# Patient Record
Sex: FEMALE | Race: BLACK OR AFRICAN AMERICAN | NOT HISPANIC OR LATINO | URBAN - METROPOLITAN AREA
[De-identification: names, ages, dates, MRNs, and addresses within clinical notes are randomized per-mention and may not be internally consistent; named-entity substitution may affect disease eponyms.]

---

## 2018-04-23 ENCOUNTER — OUTPATIENT (OUTPATIENT)
Dept: OUTPATIENT SERVICES | Facility: HOSPITAL | Age: 68
LOS: 1 days | End: 2018-04-23
Payer: COMMERCIAL

## 2018-04-23 ENCOUNTER — APPOINTMENT (OUTPATIENT)
Dept: NEUROSURGERY | Facility: CLINIC | Age: 68
End: 2018-04-23
Payer: MEDICAID

## 2018-04-23 VITALS
HEIGHT: 61 IN | BODY MASS INDEX: 35.5 KG/M2 | RESPIRATION RATE: 16 BRPM | HEART RATE: 81 BPM | WEIGHT: 188 LBS | DIASTOLIC BLOOD PRESSURE: 77 MMHG | OXYGEN SATURATION: 98 % | SYSTOLIC BLOOD PRESSURE: 148 MMHG | TEMPERATURE: 97.6 F

## 2018-04-23 DIAGNOSIS — Z87.39 PERSONAL HISTORY OF OTHER DISEASES OF THE MUSCULOSKELETAL SYSTEM AND CONNECTIVE TISSUE: ICD-10-CM

## 2018-04-23 DIAGNOSIS — Z87.19 PERSONAL HISTORY OF OTHER DISEASES OF THE DIGESTIVE SYSTEM: ICD-10-CM

## 2018-04-23 DIAGNOSIS — Z78.9 OTHER SPECIFIED HEALTH STATUS: ICD-10-CM

## 2018-04-23 PROCEDURE — 99205 OFFICE O/P NEW HI 60 MIN: CPT

## 2018-04-23 PROCEDURE — 72110 X-RAY EXAM L-2 SPINE 4/>VWS: CPT | Mod: 26

## 2018-04-23 PROCEDURE — 72110 X-RAY EXAM L-2 SPINE 4/>VWS: CPT

## 2018-04-24 PROBLEM — Z87.19 HISTORY OF DIVERTICULOSIS: Status: RESOLVED | Noted: 2018-04-23 | Resolved: 2018-04-24

## 2018-04-24 PROBLEM — Z87.39 HISTORY OF ARTHRITIS: Status: RESOLVED | Noted: 2018-04-23 | Resolved: 2018-04-24

## 2018-04-24 PROBLEM — Z78.9 NON-SMOKER: Status: ACTIVE | Noted: 2018-04-23

## 2018-04-24 PROBLEM — Z78.9 DOES NOT USE ILLICIT DRUGS: Status: ACTIVE | Noted: 2018-04-23

## 2018-04-24 PROBLEM — Z87.39 HISTORY OF OSTEOARTHRITIS: Status: RESOLVED | Noted: 2018-04-23 | Resolved: 2018-04-24

## 2018-04-26 ENCOUNTER — APPOINTMENT (OUTPATIENT)
Dept: OBGYN | Facility: CLINIC | Age: 68
End: 2018-04-26
Payer: MEDICAID

## 2018-04-26 VITALS
WEIGHT: 188.13 LBS | DIASTOLIC BLOOD PRESSURE: 80 MMHG | BODY MASS INDEX: 35.52 KG/M2 | SYSTOLIC BLOOD PRESSURE: 130 MMHG | HEIGHT: 61 IN

## 2018-04-26 DIAGNOSIS — I10 ESSENTIAL (PRIMARY) HYPERTENSION: ICD-10-CM

## 2018-04-26 DIAGNOSIS — Z82.49 FAMILY HISTORY OF ISCHEMIC HEART DISEASE AND OTHER DISEASES OF THE CIRCULATORY SYSTEM: ICD-10-CM

## 2018-04-26 DIAGNOSIS — D64.9 ANEMIA, UNSPECIFIED: ICD-10-CM

## 2018-04-26 DIAGNOSIS — Z80.3 FAMILY HISTORY OF MALIGNANT NEOPLASM OF BREAST: ICD-10-CM

## 2018-04-26 DIAGNOSIS — Z81.8 FAMILY HISTORY OF OTHER MENTAL AND BEHAVIORAL DISORDERS: ICD-10-CM

## 2018-04-26 DIAGNOSIS — R42 DIZZINESS AND GIDDINESS: ICD-10-CM

## 2018-04-26 DIAGNOSIS — E78.00 PURE HYPERCHOLESTEROLEMIA, UNSPECIFIED: ICD-10-CM

## 2018-04-26 DIAGNOSIS — K21.9 GASTRO-ESOPHAGEAL REFLUX DISEASE W/OUT ESOPHAGITIS: ICD-10-CM

## 2018-04-26 DIAGNOSIS — K57.92 DIVERTICULITIS OF INTESTINE, PART UNSPECIFIED, W/OUT PERFORATION OR ABSCESS W/OUT BLEEDING: ICD-10-CM

## 2018-04-26 DIAGNOSIS — R91.8 OTHER NONSPECIFIC ABNORMAL FINDING OF LUNG FIELD: ICD-10-CM

## 2018-04-26 DIAGNOSIS — D47.2 MONOCLONAL GAMMOPATHY: ICD-10-CM

## 2018-04-26 PROCEDURE — 99202 OFFICE O/P NEW SF 15 MIN: CPT

## 2018-04-26 RX ORDER — MECLIZINE HYDROCHLORIDE 12.5 MG/1
12.5 TABLET ORAL
Refills: 0 | Status: COMPLETED | COMMUNITY
End: 2018-04-26

## 2018-05-08 ENCOUNTER — OUTPATIENT (OUTPATIENT)
Dept: OUTPATIENT SERVICES | Facility: HOSPITAL | Age: 68
LOS: 1 days | End: 2018-05-08
Payer: COMMERCIAL

## 2018-05-08 ENCOUNTER — APPOINTMENT (OUTPATIENT)
Dept: CT IMAGING | Facility: HOSPITAL | Age: 68
End: 2018-05-08

## 2018-05-08 PROCEDURE — 72131 CT LUMBAR SPINE W/O DYE: CPT

## 2018-05-08 PROCEDURE — 72131 CT LUMBAR SPINE W/O DYE: CPT | Mod: 26

## 2018-06-04 ENCOUNTER — APPOINTMENT (OUTPATIENT)
Dept: NEUROSURGERY | Facility: CLINIC | Age: 68
End: 2018-06-04
Payer: MEDICAID

## 2018-06-04 VITALS
WEIGHT: 187 LBS | RESPIRATION RATE: 18 BRPM | HEART RATE: 74 BPM | TEMPERATURE: 97.8 F | OXYGEN SATURATION: 100 % | HEIGHT: 61 IN | SYSTOLIC BLOOD PRESSURE: 148 MMHG | BODY MASS INDEX: 35.3 KG/M2 | DIASTOLIC BLOOD PRESSURE: 77 MMHG

## 2018-06-04 PROCEDURE — 99215 OFFICE O/P EST HI 40 MIN: CPT

## 2018-06-22 ENCOUNTER — APPOINTMENT (OUTPATIENT)
Dept: ORTHOPEDIC SURGERY | Facility: CLINIC | Age: 68
End: 2018-06-22

## 2018-09-03 ENCOUNTER — FORM ENCOUNTER (OUTPATIENT)
Age: 68
End: 2018-09-03

## 2018-09-04 ENCOUNTER — APPOINTMENT (OUTPATIENT)
Dept: ORTHOPEDIC SURGERY | Facility: CLINIC | Age: 68
End: 2018-09-04
Payer: MEDICAID

## 2018-09-04 ENCOUNTER — OUTPATIENT (OUTPATIENT)
Dept: OUTPATIENT SERVICES | Facility: HOSPITAL | Age: 68
LOS: 1 days | End: 2018-09-04
Payer: COMMERCIAL

## 2018-09-04 DIAGNOSIS — Z87.19 PERSONAL HISTORY OF OTHER DISEASES OF THE DIGESTIVE SYSTEM: ICD-10-CM

## 2018-09-04 DIAGNOSIS — Z86.39 PERSONAL HISTORY OF OTHER ENDOCRINE, NUTRITIONAL AND METABOLIC DISEASE: ICD-10-CM

## 2018-09-04 PROCEDURE — 73564 X-RAY EXAM KNEE 4 OR MORE: CPT | Mod: 26,LT,RT

## 2018-09-04 PROCEDURE — 73564 X-RAY EXAM KNEE 4 OR MORE: CPT

## 2018-09-04 PROCEDURE — 72170 X-RAY EXAM OF PELVIS: CPT

## 2018-09-04 PROCEDURE — 99203 OFFICE O/P NEW LOW 30 MIN: CPT

## 2018-09-04 PROCEDURE — 72170 X-RAY EXAM OF PELVIS: CPT | Mod: 26

## 2018-10-19 ENCOUNTER — APPOINTMENT (OUTPATIENT)
Dept: ORTHOPEDIC SURGERY | Facility: CLINIC | Age: 68
End: 2018-10-19

## 2019-03-12 ENCOUNTER — APPOINTMENT (OUTPATIENT)
Dept: GASTROENTEROLOGY | Facility: CLINIC | Age: 69
End: 2019-03-12

## 2019-05-07 ENCOUNTER — APPOINTMENT (OUTPATIENT)
Dept: ORTHOPEDIC SURGERY | Facility: CLINIC | Age: 69
End: 2019-05-07

## 2019-09-09 ENCOUNTER — APPOINTMENT (OUTPATIENT)
Dept: NEUROSURGERY | Facility: CLINIC | Age: 69
End: 2019-09-09
Payer: MEDICARE

## 2019-09-09 VITALS
WEIGHT: 195 LBS | SYSTOLIC BLOOD PRESSURE: 137 MMHG | BODY MASS INDEX: 36.82 KG/M2 | DIASTOLIC BLOOD PRESSURE: 81 MMHG | RESPIRATION RATE: 16 BRPM | OXYGEN SATURATION: 98 % | HEART RATE: 78 BPM | HEIGHT: 61 IN

## 2019-09-09 PROCEDURE — 99215 OFFICE O/P EST HI 40 MIN: CPT

## 2019-09-10 NOTE — ASSESSMENT
[FreeTextEntry1] : MRI lumbar spine from 8/28/19 reviewed by Dr. Hopkins with patient in detail, demonstrates spinal stenosis at L4-L5, L5-S1.\par Discussed treatment options including surgery, conservative measures (pain management, PT) in detail. Risks, benefits and alternatives to treatment options discussed in language the patient understands and an understanding was verbalized.\par The patient prefers to pursue conservative measures at this point and would like to see pain management. She does not want to proceed with surgery at this time.\par Referral provided for pain management - Dr. Ehsan Aguila. Patient will make an appointment.\par Educated regarding s/s neurological worsening including weakness, numbness/tingling, bowel/bladder incontinence report to ED or notify MD.\par If symptoms persist or worsen, return to the office for further discussion of surgery.\par Education provided regarding plan of care.\par \par Patient verbalizes agreement and understanding with plan.

## 2019-09-10 NOTE — DATA REVIEWED
[de-identified] : MRI lumbar spine without contrast 8/28/19:\par \par Impression:\par 1. No acute compression fracture\par 2. Degenerative changes resulting in spinal canal and neural foraminal stenosis similar prior.\par 3. Moderate spinal canal stenosis at L4-L5 and moderate LEFT neuroforaminal narrowing at L4-L5\par 4. Severe right and moderate left neuroforaminal narrowing at L5-S1\par 5. Uterine leiomyomata

## 2019-09-10 NOTE — HISTORY OF PRESENT ILLNESS
[FreeTextEntry1] : This is a 67 year old woman who came to the office last month with complaints of progressive, worsening low back pain for several years and LEFT leg radicular pain above the knee in an L3 distribution. Her leg pain subsided after an epidural steroid injection, however, her back pain has not improved. Her MRI lumbar spine shows a left side L3-4 lateral recess and foraminal disc herniation that severely encroaches on the exiting L3 nerve root. She also has a large pelvic fibroid that may be compressing the lumbosacral plexus. She was referred to GYN for consultation regarding fibroid. She returns today for follow up s/p CT lumbosacral spine. She reports that her left leg pain has returned and radiates from her lower back down the left leg to the foot. She also reports numbness and tingling down the left posterior leg. She complains of left knee pain and swelling as well. She has an appointment with an orthopedic surgeon regarding her knee pain. She also states she was seen by CELSO Hutton for her uterine fibroid and referred to Dr. Pagan for evaluation for hysterectomy. \par \par CT lumbar spine shows multilevel degenerative changes without fracture or evidence of instability

## 2019-09-10 NOTE — PHYSICAL EXAM
[General Appearance - Alert] : alert [General Appearance - In No Acute Distress] : in no acute distress [Oriented To Time, Place, And Person] : oriented to person, place, and time [Straight-Leg Raise Test - Left] : straight leg raise of the left leg was positive [Straight-Leg Raise Test - Right] : straight leg raise of the right leg was positive [Antalgic] : antalgic [Sclera] : the sclera and conjunctiva were normal [Outer Ear] : the ears and nose were normal in appearance [Neck Appearance] : the appearance of the neck was normal [] : no respiratory distress [Respiration, Rhythm And Depth] : normal respiratory rhythm and effort [Heart Rate And Rhythm] : heart rate was normal and rhythm regular [Abnormal Walk] : normal gait [Skin Color & Pigmentation] : normal skin color and pigmentation [FreeTextEntry6] : strength exam limited due to pain

## 2019-09-10 NOTE — REASON FOR VISIT
[Follow-Up: _____] : a [unfilled] follow-up visit [FreeTextEntry1] : \sandra Returns to the office today with new MRI lumbar spine to review.\par Ms. Sorensen states that after she saw Dr. Hopkins last June 2018, she followed up with her GYN, Dr. Wendy Daley who did not feel that a hysterectomy or removal of uterine fibroid was necessary nor contributing to Ms. Sorensen's pain. \par She saw a pain management doctor after that and had TORITO x 2 with resolution of symptoms.\par However, over the past 2-3 months she states that symptoms have returned. She reports low back pain radiating to bilateral groin (RIGHT greater than LEFT). She also reports radiating leg pain bilaterally down bilateral buttocks into posterior thighs with associated numbness/tingling. Her most severe pain is low back pain and bilateral groin pain. Pain is made worse with ambulation and improves with rest.\par She recently went to [facility] ED due to worsening symptoms. MRI lumbar spine was done at that visit on 8/28/19 and she brings it today for review. She was recommended to follow up with pain management but has not done so at this point.\par \sandra Denies bowel/bladder incontinence. She reports leg weakness due to pain.\par

## 2019-10-07 ENCOUNTER — FORM ENCOUNTER (OUTPATIENT)
Age: 69
End: 2019-10-07

## 2019-10-08 ENCOUNTER — APPOINTMENT (OUTPATIENT)
Dept: ORTHOPEDIC SURGERY | Facility: CLINIC | Age: 69
End: 2019-10-08
Payer: MEDICARE

## 2019-10-08 ENCOUNTER — OUTPATIENT (OUTPATIENT)
Dept: OUTPATIENT SERVICES | Facility: HOSPITAL | Age: 69
LOS: 1 days | End: 2019-10-08
Payer: COMMERCIAL

## 2019-10-08 DIAGNOSIS — M17.0 BILATERAL PRIMARY OSTEOARTHRITIS OF KNEE: ICD-10-CM

## 2019-10-08 PROCEDURE — 73562 X-RAY EXAM OF KNEE 3: CPT | Mod: 26,50

## 2019-10-08 PROCEDURE — 73562 X-RAY EXAM OF KNEE 3: CPT

## 2019-10-08 PROCEDURE — 99214 OFFICE O/P EST MOD 30 MIN: CPT

## 2019-10-22 NOTE — ASSESSMENT
[FreeTextEntry1] : 70yo female with bilateral knee OA\par \par Cymedical study right knee\par Euflexa injections ordered for left knee\par follow up as directed\par \par \par All medical record entries made by the PA/Janinaibjudy/Fellow are at my, Dr. Timothy Mcdaniel's direction and personally dictated by me on 10/08/2019]. I have reviewed the chart and agree that the record accurately reflects my personal performance of the history, physical exam, assessment, and plan. I have also personally directed reviewed, and agreed with the chart.\par

## 2019-10-22 NOTE — PHYSICAL EXAM
[de-identified] : Right and  Left Knee: skin is intact, no erythema. no joint effusion. + lateral and medial joint line tenderness, + apleys, negative lachman, no collateral ligamentous laxity with stress. ROM is 5-100 degrees with no pain. DF/PF/EHL intact with 5/5 strength. 2+ DP pulse. SILT L3-S1.\par  [de-identified] : right and left knee xray: no fractures,  joint space narrowing, osteophyte formation. impression- bilateral tricompartmental Knee OA with varus alignment. \par

## 2019-10-22 NOTE — HISTORY OF PRESENT ILLNESS
[Worsening] : worsening [3] : an average pain level of 3/10 [de-identified] : severe bilateral knee pain for many years. no trauma. steroid injection x3 have not helped over the last 2 years. she has pain every day. no relief with PT.

## 2019-10-23 ENCOUNTER — APPOINTMENT (OUTPATIENT)
Dept: PULMONOLOGY | Facility: CLINIC | Age: 69
End: 2019-10-23

## 2019-11-08 ENCOUNTER — RX RENEWAL (OUTPATIENT)
Age: 69
End: 2019-11-08

## 2019-11-15 ENCOUNTER — APPOINTMENT (OUTPATIENT)
Dept: PULMONOLOGY | Facility: CLINIC | Age: 69
End: 2019-11-15

## 2020-03-02 ENCOUNTER — APPOINTMENT (OUTPATIENT)
Dept: OBGYN | Facility: CLINIC | Age: 70
End: 2020-03-02

## 2020-08-05 ENCOUNTER — APPOINTMENT (OUTPATIENT)
Dept: OBGYN | Facility: CLINIC | Age: 70
End: 2020-08-05

## 2020-09-18 ENCOUNTER — APPOINTMENT (OUTPATIENT)
Dept: OBGYN | Facility: CLINIC | Age: 70
End: 2020-09-18
Payer: MEDICARE

## 2020-09-18 VITALS
HEIGHT: 61 IN | WEIGHT: 205.31 LBS | BODY MASS INDEX: 38.76 KG/M2 | SYSTOLIC BLOOD PRESSURE: 130 MMHG | DIASTOLIC BLOOD PRESSURE: 81 MMHG

## 2020-09-18 DIAGNOSIS — Z00.00 ENCOUNTER FOR GENERAL ADULT MEDICAL EXAMINATION W/OUT ABNORMAL FINDINGS: ICD-10-CM

## 2020-09-18 DIAGNOSIS — D25.9 LEIOMYOMA OF UTERUS, UNSPECIFIED: ICD-10-CM

## 2020-09-18 PROCEDURE — 99214 OFFICE O/P EST MOD 30 MIN: CPT

## 2020-09-18 RX ORDER — VALSARTAN AND HYDROCHLOROTHIAZIDE 160; 12.5 MG/1; MG/1
160-12.5 TABLET, FILM COATED ORAL
Refills: 0 | Status: ACTIVE | COMMUNITY

## 2020-09-18 RX ORDER — SPIRONOLACTONE 50 MG/1
TABLET ORAL
Refills: 0 | Status: ACTIVE | COMMUNITY

## 2020-09-18 NOTE — REVIEW OF SYSTEMS
[Pelvic pain] : pelvic pain [Joint Stiffness] : joint stiffness [Back Pain] : back pain [Negative] : Heme/Lymph

## 2020-09-18 NOTE — PHYSICAL EXAM
[Appropriately responsive] : appropriately responsive [Alert] : alert [No Acute Distress] : no acute distress [No Lymphadenopathy] : no lymphadenopathy [Regular Rate Rhythm] : regular rate rhythm [No Murmurs] : no murmurs [Clear to Auscultation B/L] : clear to auscultation bilaterally [Soft] : soft [Non-tender] : non-tender [Non-distended] : non-distended [No HSM] : No HSM [No Lesions] : no lesions [No Mass] : no mass [Oriented x3] : oriented x3 [Examination Of The Breasts] : a normal appearance [No Masses] : no breast masses were palpable [Labia Majora] : normal [Labia Minora] : normal [Atrophy] : atrophy [Normal] : normal [Enlarged ___ wks] : enlarged [unfilled] ~Uweeks [Uterine Adnexae] : normal [No Tenderness] : no tenderness [Nl Sphincter Tone] : normal sphincter tone [FreeTextEntry7] : OBESE [Tenderness] : nontender [Mass ___ cm] : no uterine mass was palpated [FreeTextEntry9] : GUAIAC NEGATIVE

## 2020-09-18 NOTE — HISTORY OF PRESENT ILLNESS
[Patient reported PAP Smear was normal] : Patient reported PAP Smear was normal [TextBox_4] : THERESE  PRESENTS WITH C/O PELVIC PAIN.  PAIN OCCURS PRIMARILY IN HER LOWER BACK.  ALSO SHE REPORTS IT IS SHARP AND RADIATES DOWN HER LEGS.  NO ASSOCIATED BLEEDING. \par  [PapSmeardate] : 09/2018

## 2020-09-24 LAB — CYTOLOGY CVX/VAG DOC THIN PREP: ABNORMAL

## 2020-10-02 ENCOUNTER — TRANSCRIPTION ENCOUNTER (OUTPATIENT)
Age: 70
End: 2020-10-02

## 2020-10-09 ENCOUNTER — APPOINTMENT (OUTPATIENT)
Dept: NEUROSURGERY | Facility: CLINIC | Age: 70
End: 2020-10-09
Payer: MEDICARE

## 2020-10-12 ENCOUNTER — APPOINTMENT (OUTPATIENT)
Dept: NEUROSURGERY | Facility: CLINIC | Age: 70
End: 2020-10-12
Payer: MEDICARE

## 2020-10-16 ENCOUNTER — APPOINTMENT (OUTPATIENT)
Dept: NEUROSURGERY | Facility: CLINIC | Age: 70
End: 2020-10-16
Payer: MEDICARE

## 2020-10-16 VITALS
RESPIRATION RATE: 18 BRPM | WEIGHT: 195 LBS | SYSTOLIC BLOOD PRESSURE: 152 MMHG | HEART RATE: 79 BPM | BODY MASS INDEX: 36.82 KG/M2 | HEIGHT: 61 IN | DIASTOLIC BLOOD PRESSURE: 79 MMHG | OXYGEN SATURATION: 99 % | TEMPERATURE: 98.2 F

## 2020-10-16 PROCEDURE — 99215 OFFICE O/P EST HI 40 MIN: CPT

## 2020-10-16 NOTE — REVIEW OF SYSTEMS
[As Noted in HPI] : as noted in HPI [Numbness] : numbness [Tingling] : tingling [Difficulty Walking] : difficulty walking [Inability to Walk] : inability to walk [Negative] : Heme/Lymph

## 2020-10-16 NOTE — REASON FOR VISIT
[FreeTextEntry1] : \par TODAY'S VISIT:\sandra Returns today with worsening symptoms.\par She reports severe low back pain radiating to bilateral groin. She states the pain is constant but but exacerbated with ambulation.\par Due to COVID 19 pandemic, she has not participated in supervised physical therapy.\par \par Ms. Sorensen states she followed up with her GYN, Dr. Wendy Daley who did not feel that a hysterectomy or removal of uterine fibroid was necessary nor contributing to Ms. Sorensen's pain. \par Ms. Sorensen has said she would like to get a second opinion regarding her uterine fibroid.\par \par She reports worsening urinary frequency and occasional urinary incontinence.\par She reports numbness/tingling in lower back. She states the pain is so severe that she is unable to participate in routine activities.\par She has used oral pain medications in the past including flexeril, gabapentin without relief of symptoms.\par \par She did consult with pain management and has had several ESIs without relief of symptoms.\par \par PREVIOUS VISIT:\sandra Returns to the office today with new MRI lumbar spine to review.\par Ms. Sorensen states that after she saw Dr. Hopkins last June 2018, she followed up with her GYN, Dr. Wendy Daley who did not feel that a hysterectomy or removal of uterine fibroid was necessary nor contributing to Ms. Sorensen's pain. \par She saw a pain management doctor after that and had TORITO x 2 with resolution of symptoms.\par However, over the past 2-3 months she states that symptoms have returned. She reports low back pain radiating to bilateral groin (RIGHT greater than LEFT). She also reports radiating leg pain bilaterally down bilateral buttocks into posterior thighs with associated numbness/tingling. Her most severe pain is low back pain and bilateral groin pain. Pain is made worse with ambulation and improves with rest.\par She recently went to [facility] ED due to worsening symptoms. MRI lumbar spine was done at that visit on 8/28/19 and she brings it today for review. She was recommended to follow up with pain management but has not done so at this point.\par \par Denies bowel/bladder incontinence. She reports leg weakness due to pain.\par

## 2020-10-16 NOTE — PHYSICAL EXAM
[General Appearance - Alert] : alert [General Appearance - In No Acute Distress] : in no acute distress [Oriented To Time, Place, And Person] : oriented to person, place, and time [4] : S1 ankle flexors 4/5 [Sclera] : the sclera and conjunctiva were normal [Outer Ear] : the ears and nose were normal in appearance [Neck Appearance] : the appearance of the neck was normal [] : no respiratory distress [Abnormal Walk] : normal gait [Skin Color & Pigmentation] : normal skin color and pigmentation [FreeTextEntry6] : limited physical examination due to pain

## 2020-10-16 NOTE — ASSESSMENT
[FreeTextEntry1] : Ms. Sorensen returns with worsening neurological symptoms with severe pain and numbness/tingling.\par She has failed conservative measures including supervised physical therapy and pain management.\par Recommend MRI lumbar spine without contrast.\par Recommend nerve conduction studies - referred to Dr. Mary Joaquin, patient will call and schedule appointment\par Recommend TLSO back brace while ambulating for low back pain\par Recommend physical therapy for muscle strengthening, gait training, balance and coordination.\par \par After MRI and workup complete, return to the office to review results with Dr. Hopkins.\par \par Patient and patient's family verbalize agreement and understanding with plan of care.\par \par

## 2020-10-26 ENCOUNTER — APPOINTMENT (OUTPATIENT)
Dept: PHYSICAL MEDICINE AND REHAB | Facility: CLINIC | Age: 70
End: 2020-10-26
Payer: MEDICARE

## 2020-10-26 VITALS
BODY MASS INDEX: 36.82 KG/M2 | TEMPERATURE: 97.5 F | WEIGHT: 195 LBS | HEIGHT: 61 IN | SYSTOLIC BLOOD PRESSURE: 171 MMHG | RESPIRATION RATE: 17 BRPM | HEART RATE: 70 BPM | DIASTOLIC BLOOD PRESSURE: 92 MMHG

## 2020-10-26 DIAGNOSIS — M17.10 UNILATERAL PRIMARY OSTEOARTHRITIS, UNSPECIFIED KNEE: ICD-10-CM

## 2020-10-26 DIAGNOSIS — R10.32 RIGHT LOWER QUADRANT PAIN: ICD-10-CM

## 2020-10-26 DIAGNOSIS — E66.9 OBESITY, UNSPECIFIED: ICD-10-CM

## 2020-10-26 DIAGNOSIS — M16.10 UNILATERAL PRIMARY OSTEOARTHRITIS, UNSPECIFIED HIP: ICD-10-CM

## 2020-10-26 DIAGNOSIS — R10.31 RIGHT LOWER QUADRANT PAIN: ICD-10-CM

## 2020-10-26 DIAGNOSIS — R26.89 OTHER ABNORMALITIES OF GAIT AND MOBILITY: ICD-10-CM

## 2020-10-26 PROCEDURE — 99204 OFFICE O/P NEW MOD 45 MIN: CPT

## 2020-10-26 PROCEDURE — 99072 ADDL SUPL MATRL&STAF TM PHE: CPT

## 2020-10-26 NOTE — REVIEW OF SYSTEMS
[Patient Intake Form Reviewed] : Patient intake form was reviewed [Joint Pain] : joint pain [Joint Stiffness] : joint stiffness [Muscle Pain] : muscle pain [Difficulty Walking] : difficulty walking [Negative] : Heme/Lymph [Fever] : no fever [Chills] : no chills [Fatigue] : no fatigue [Recent Change In Weight] : ~T no recent weight change [Chest Pain] : no chest pain [Leg Claudication] : no intermittent leg claudication [Lower Ext Edema] : no lower extremity edema [Muscle Weakness] : no muscle weakness [FreeTextEntry9] : many joints

## 2020-10-26 NOTE — PHYSICAL EXAM
[FreeTextEntry1] : PHYSICAL EXAM : OBJECTIVE \par \par GENERAL : Awake ,alert and oriented to time place and person \par HEAD : normocephalic and atraumatic \par NECK : supple ,no tracheal deviation ,no thyroid enlargement noted with swallowing\par EYES : sclera and conjunctiva normal no redness,intact extraocular movements \par ENT  : ears and nose normal in appearance -hearing adequate \par PULMONARY: effort normal. No respiratory distress. breathing regular. No wheezes \par LYMPH : No swelling in limbs, capillary return within normal range \par CVS : warm extremities,no palpitations,not short of breath, no visible jugular venous distention\par PSYCH : mood and affect normal ,good eye contact ,normal attention \par ABDOMEN : no visible distension , \par NEUROLOGICAL:cranial nerves intact,muscle tone normal,gait and balance safe except where noted below \par SKIN : warm and dry No rash detected over specific body areas examined \par MUSCULOSKELETAL: normal muscle bulk, no focal bony tenderness /posture normal except where specified below\par overweight ++ poor core \par HIP ROM very painful \par knees painful to range or palpate\par SLR neg \par weak EHL leonides 4/5  but no foott drop \par plantar flex 5/5\par no cane \par No long tract signs found on clinical exam and no focal neurological deficits\par vibration intact sensation ok  \par
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
## 2020-10-26 NOTE — ASSESSMENT
[FreeTextEntry1] : \par PLAN AND RECOMMENDATIONS :\par \par We discussed differential diagnosis and clinical impression\par agree with EMG -assess her chronic radiculopathy \par also get xrays hip -may be adding to her LBP issues \par she has never had xrays of hips she says \par \par Recommend\par .symptomatic care and support\par  medications as per PCP \par  imaging -correlate with MRI \par get xrays hips \par EMG LE and UE if needed \par \par  hydrotherapy /heat / cold for pain\par  continue  spinal precautions including care with bending, lifting, twisting and  carrying.\par \par  relative rest and avoidance of painful activity where possible \par  increasing activity as discussed \par  return for follow up\par weight loss \par \par Information given to patient about EMG and Nerve Conduction Study Examination including  planning, differential diagnosis to rule in /rule out ,duration of the test ,precautions (if patient on blood thinner.has bleeding disorder or  pace maker device etc -still possible to undergo with care), side effects(benign-limited to short term bruising and discomfort/pain)  \par The protocol of temp checks upon arrival ,disinfection procedure of waiting room and the lab explained- reassured. \par All questions answered. \par Patient instructed to book appointment upon conclusion of appointment\par \par Information sheet ' Answers to your Questions on EMG " forwarded to patient to read prior to testing, with further information about training,background and the procedure itself .\par

## 2020-10-26 NOTE — CONSULT LETTER
[FreeTextEntry1] : Dear Dr. HOANG RAMIREZ  ,  HERBER JEFFERY \par \par I had the pleasure of evaluating your patient, THERESE MAGANA .\par \par Thank you very much for allowing me to participate in the care of this patient. If you have any questions, please do not hesitate to contact me. \par \par Sincerely, \par Mary Joaquin MD \par \par ABPMR Board Certified in Physical Medicine and Rehabilitation\par Certified Fellow of AANEM (Neuromuscular and Electrodiagnostic Medicine)\par Subspecialty certified in Sports Medicine (ABPMR)\par \par  of Physical Medicine and Rehabilitation\par [employer] [facility]\par [facility]\par \par

## 2020-11-09 DIAGNOSIS — F41.9 ANXIETY DISORDER, UNSPECIFIED: ICD-10-CM

## 2020-12-22 ENCOUNTER — APPOINTMENT (OUTPATIENT)
Dept: PHYSICAL MEDICINE AND REHAB | Facility: CLINIC | Age: 70
End: 2020-12-22

## 2020-12-28 ENCOUNTER — APPOINTMENT (OUTPATIENT)
Dept: MRI IMAGING | Facility: HOSPITAL | Age: 70
End: 2020-12-28

## 2021-03-19 ENCOUNTER — APPOINTMENT (OUTPATIENT)
Dept: NEUROSURGERY | Facility: CLINIC | Age: 71
End: 2021-03-19
Payer: MEDICARE

## 2021-03-19 VITALS
WEIGHT: 200 LBS | SYSTOLIC BLOOD PRESSURE: 189 MMHG | HEIGHT: 61 IN | BODY MASS INDEX: 37.76 KG/M2 | RESPIRATION RATE: 18 BRPM | HEART RATE: 86 BPM | OXYGEN SATURATION: 99 % | TEMPERATURE: 96.9 F | DIASTOLIC BLOOD PRESSURE: 91 MMHG

## 2021-03-19 PROCEDURE — 99214 OFFICE O/P EST MOD 30 MIN: CPT

## 2021-03-19 RX ORDER — ALPRAZOLAM 0.25 MG/1
0.25 TABLET ORAL
Qty: 3 | Refills: 0 | Status: DISCONTINUED | COMMUNITY
Start: 2020-11-09 | End: 2021-03-19

## 2021-03-19 RX ORDER — IBUPROFEN 600 MG/1
600 TABLET, FILM COATED ORAL EVERY 8 HOURS
Qty: 42 | Refills: 0 | Status: ACTIVE | COMMUNITY
Start: 2021-03-19 | End: 1900-01-01

## 2021-03-19 NOTE — PHYSICAL EXAM
[General Appearance - Alert] : alert [General Appearance - In No Acute Distress] : in no acute distress [Oriented To Time, Place, And Person] : oriented to person, place, and time [Motor Tone] : muscle tone was normal in all four extremities [Motor Strength] : muscle strength was normal in all four extremities [No Visual Abnormalities] : no visible abnormailities [Antalgic] : antalgic [Sclera] : the sclera and conjunctiva were normal [Outer Ear] : the ears and nose were normal in appearance [Neck Appearance] : the appearance of the neck was normal [] : no respiratory distress [Abnormal Walk] : normal gait [Skin Color & Pigmentation] : normal skin color and pigmentation [Able to toe walk] : the patient was not able to toe walk [Able to heel walk] : the patient was not able to heel walk

## 2021-03-19 NOTE — ASSESSMENT
[FreeTextEntry1] : MRI lumbar spine without contrast done on 12/2020 reviewed by Dr. Hopkins, demonstrates L5-S1 severe right and moderate left neuroforaminal narrowing.\par Recommend following up in the office after endometrial biopsy/surgical resection of uterine fibroid to evaluate symptoms.\par In the interim, recommend consultation with pain management for consideration of TORITO - referred to Dr. Ehsan Aguila.\par Instructed to follow up with PCP regarding elevated BP. Reviewed s/s stroke with patient. If present, report to ED.\par \par Patient verbalizes agreement and understanding with plan of care.\par

## 2021-03-19 NOTE — REASON FOR VISIT
[Follow-Up: _____] : a [unfilled] follow-up visit [FreeTextEntry1] : \par TODAY'S VISIT:\sandra Returns to the office with worsening symptoms. She brings MRI lumbar spine from December 2020 that she had done at [facility].\par \par She reports increased cramping and pressure in groin as well as intermittent vaginal bleeding. She is seeing GYN surgeons at [place] and is planned for endometrial biopsy prior to large surgical resection of uterine fibroid. She is scheduled for biopsy on 4/2/21.\par \par She continues to report low back pain, worsened by walking. She also reports radiating leg pain (LEFT worse than RIGHT), most severe in LEFT groin.\par \par \par PREVIOUS OFFICE VISIT 10/16/20:\sandra Returns today with worsening symptoms.\par She reports severe low back pain radiating to bilateral groin. She states the pain is constant but but exacerbated with ambulation.\par Due to COVID 19 pandemic, she has not participated in supervised physical therapy.\par \par Ms. Sorensen states she followed up with her GYN, Dr. Wendy Daley who did not feel that a hysterectomy or removal of uterine fibroid was necessary nor contributing to Ms. Sorensen's pain. \par Ms. Sorensen has said she would like to get a second opinion regarding her uterine fibroid.\par \par She reports worsening urinary frequency and occasional urinary incontinence.\par She reports numbness/tingling in lower back. She states the pain is so severe that she is unable to participate in routine activities.\par She has used oral pain medications in the past including flexeril, gabapentin without relief of symptoms.\par \par She did consult with pain management and has had several ESIs without relief of symptoms.\par \par PREVIOUS VISIT:\sandra Returns to the office today with new MRI lumbar spine to review.\par Ms. Sorensen states that after she saw Dr. Hopkins last June 2018, she followed up with her GYN, Dr. Wendy Daley who did not feel that a hysterectomy or removal of uterine fibroid was necessary nor contributing to Ms. Sorensen's pain. \par She saw a pain management doctor after that and had TORITO x 2 with resolution of symptoms.\par However, over the past 2-3 months she states that symptoms have returned. She reports low back pain radiating to bilateral groin (RIGHT greater than LEFT). She also reports radiating leg pain bilaterally down bilateral buttocks into posterior thighs with associated numbness/tingling. Her most severe pain is low back pain and bilateral groin pain. Pain is made worse with ambulation and improves with rest.\par She recently went to [facility] ED due to worsening symptoms. MRI lumbar spine was done at that visit on 8/28/19 and she brings it today for review. She was recommended to follow up with pain management but has not done so at this point.\par \par Denies bowel/bladder incontinence. She reports leg weakness due to pain.\par

## 2021-03-19 NOTE — DATA REVIEWED
[de-identified] : MRI lumbar spine without contrast 12/29/2020:\par \par Impression: \par \par There is no acute fracture or dislocation. There is no compression fracture deformity.\par \par L3-4: There is moderate left neural foraminal narrowing with impingement of the exiting L3 nerve root.\par \par L4-5: There is moderate to severe spinal canal stenosis. There is moderate left and mild to moderate right neural foraminal narrowing.\par L5-S1: There is severe right and moderate left neuroforaminal narrowing

## 2022-02-25 ENCOUNTER — APPOINTMENT (OUTPATIENT)
Dept: NEUROSURGERY | Facility: CLINIC | Age: 72
End: 2022-02-25

## 2022-03-14 ENCOUNTER — APPOINTMENT (OUTPATIENT)
Dept: NEUROSURGERY | Facility: CLINIC | Age: 72
End: 2022-03-14

## 2022-05-04 DIAGNOSIS — Z86.59 PERSONAL HISTORY OF OTHER MENTAL AND BEHAVIORAL DISORDERS: ICD-10-CM

## 2022-05-04 RX ORDER — ALPRAZOLAM 0.25 MG/1
0.25 TABLET ORAL
Qty: 2 | Refills: 0 | Status: ACTIVE | COMMUNITY
Start: 2022-05-04 | End: 1900-01-01

## 2022-05-16 ENCOUNTER — APPOINTMENT (OUTPATIENT)
Dept: NEUROSURGERY | Facility: CLINIC | Age: 72
End: 2022-05-16

## 2022-05-16 DIAGNOSIS — M48.061 SPINAL STENOSIS, LUMBAR REGION WITHOUT NEUROGENIC CLAUDICATION: ICD-10-CM

## 2022-05-16 DIAGNOSIS — M54.50 LOW BACK PAIN, UNSPECIFIED: ICD-10-CM

## 2022-05-19 ENCOUNTER — OUTPATIENT (OUTPATIENT)
Dept: OUTPATIENT SERVICES | Facility: HOSPITAL | Age: 72
LOS: 1 days | End: 2022-05-19
Payer: MEDICARE

## 2022-05-19 ENCOUNTER — APPOINTMENT (OUTPATIENT)
Dept: MRI IMAGING | Facility: HOSPITAL | Age: 72
End: 2022-05-19
Payer: MEDICARE

## 2022-05-19 PROBLEM — M48.061 LUMBAR SPINAL STENOSIS: Status: ACTIVE | Noted: 2019-09-10

## 2022-05-19 PROBLEM — M54.50 LOW BACK PAIN: Status: ACTIVE | Noted: 2018-04-23

## 2022-05-19 PROCEDURE — 72148 MRI LUMBAR SPINE W/O DYE: CPT | Mod: 26

## 2022-05-19 PROCEDURE — 72148 MRI LUMBAR SPINE W/O DYE: CPT

## 2022-05-23 ENCOUNTER — APPOINTMENT (OUTPATIENT)
Dept: NEUROSURGERY | Facility: CLINIC | Age: 72
End: 2022-05-23

## 2022-05-27 ENCOUNTER — APPOINTMENT (OUTPATIENT)
Dept: NEUROSURGERY | Facility: CLINIC | Age: 72
End: 2022-05-27
Payer: MEDICARE

## 2022-05-27 ENCOUNTER — NON-APPOINTMENT (OUTPATIENT)
Age: 72
End: 2022-05-27

## 2022-05-27 PROCEDURE — 99214 OFFICE O/P EST MOD 30 MIN: CPT

## 2022-05-31 NOTE — ASSESSMENT
[FreeTextEntry1] : MRI lumbar spine 5/19/22 reviewed by Dr. Hopkins with patient which shows severe right L5-S1 and moderate left neuroforaminal narrowing. \par \par EMG of the lower extremities is recommended and referred to Dr. Mary Joaquin.\par Possible surgery pending EMG report.\par \par She will follow up with Dr. Hopkins after EMG.\par \par \par I, Dr. Hopkins, personally performed the evaluation and management (E/M) services for this established patient who presents today with (a) new problem(s)/exacerbation of (an) existing condition(s). That E/M includes conducting the examination, assessing all new/exacerbated conditions, and establishing a new plan of care. Today, my ACP, Opal Bello, was here to observe my evaluation and management services for this new problem/exacerbated condition to be followed going forward.\par \par

## 2022-05-31 NOTE — PHYSICAL EXAM
[General Appearance - Alert] : alert [General Appearance - In No Acute Distress] : in no acute distress [Oriented To Time, Place, And Person] : oriented to person, place, and time [Impaired Insight] : insight and judgment were intact [Cranial Nerves Optic (II)] : visual acuity intact bilaterally,  pupils equal round and reactive to light [Cranial Nerves Oculomotor (III)] : extraocular motion intact [Cranial Nerves Trigeminal (V)] : facial sensation intact symmetrically [Cranial Nerves Facial (VII)] : face symmetrical [Cranial Nerves Vestibulocochlear (VIII)] : hearing was intact bilaterally [Cranial Nerves Glossopharyngeal (IX)] : tongue and palate midline [Cranial Nerves Accessory (XI - Cranial And Spinal)] : head turning and shoulder shrug symmetric [Cranial Nerves Hypoglossal (XII)] : there was no tongue deviation with protrusion [No Visual Abnormalities] : no visible abnormailities [Antalgic] : antalgic [] : no respiratory distress [Respiration, Rhythm And Depth] : normal respiratory rhythm and effort [Able to toe walk] : the patient was not able to toe walk [Able to heel walk] : the patient was not able to heel walk

## 2022-05-31 NOTE — DATA REVIEWED
[de-identified] : [facility]\par    [facility] Department of Radiology\par   Radiology Report\par \par \par Patient Name: IZZY SALEH   Report Date: 21-May-2022 12:39.00 \par Patient ID: [id number] ([id number]), [id number] ([id number])  Accession No.: [id number] \par Patient Birth Date: 1950  Report Status: F \par Referring Physician: [id number] FRANCINE GOODE   Reason For Study: To evaluate disease progression to guide treatment planning. Please compare to p  \par \par \par \par \par ACC: [id number] EXAM: MR SPINE LUMBAR\par \par PROCEDURE DATE: 05/19/2022\par \par \par \par INTERPRETATION: PROCEDURE: MRI of the lumbar spine without contrast\par \par INDICATION: Low back pain\par \par TECHNIQUE: Sagittal T1, T2, STIR, and axial T1 and T2 weighted images of the lumbar spine were obtained.\par \par COMPARISON: CT lumbar spine 05/08/2018\par \par FINDINGS:\par \par Alignment of the lumbar spine shows minor grade 1 anterolisthesis at the L4-5 level in the setting of facet arthrosis, similar to prior study. No new level of listhesis.\par \par Vertebral bodies are normal in height. There is no marrow replacing lesion or edema signal on STIR. Intervertebral discs are preserved in height with age-related loss of T2 signal. Chronic and fatty discogenic endplate changes are noted at the L4-5 level, asymmetric to the left. Localizer images show minimal levocurvature.\par \par Conus medullaris ends at the L1-2 level. Nerve roots of the cauda equina appear neither compressed nor clumped.\par \par At the individual levels:\par \par L1-2: No disc herniation, spinal stenosis or neural foraminal narrowing.\par L2-3: Minimal disc bulge without spinal stenosis or neural foraminal\par L3-4: Mild diffuse disc bulge and ligamentum flavum thickening but no spinal stenosis. Disc bulge contributes to mild left neural foraminal narrowing.\par L4-5: There is diffuse posterior disc protrusion which along with ligamentum flavum thickening and facet arthrosis results in mild spinal canal stenosis. Axial imaging shows disc material eccentric to the left in close proximity to the exited left L4 nerve root on axial images 25 and 26. Disc bulge and facet arthrosis contributes to moderate left and mild right neural foraminal narrowing.\par L5-S1: There is mild disc bulge without focal herniation or spinal stenosis. Disc bulge and facet arthrosis contributes to moderate bilateral neural foraminal narrowing. In addition, cystic foci are noted at the right neural foramen posteriorly on axial image 30 and sagittal image 13 which may contribute to right L5 nerve compression and could be a cyst of synovial or perineural origin.\par \par No acute extraspinal finding shown. Renal cysts are partially noted.\par \par \par IMPRESSION:\par \par L4-5 disc protrusion, ligamentum flavum thickening and facet arthrosis contributes to mild spinal canal stenosis and left asymmetric neural foraminal narrowing.\par \par L5-S1 disc bulge and facet arthrosis results in moderate bilateral neural foraminal narrowing, and may be worse on the right in the presence of either synovial or perineural cyst.\par \par --- End of Report ---\par \par \par \par \par \par KEHINDE RODRIGUEZ MD; Attending Radiologist\par This document has been electronically signed. May 21 2022 12:39PM \par

## 2022-05-31 NOTE — HISTORY OF PRESENT ILLNESS
[FreeTextEntry1] : 72 y/o female with PMHx of HTN, osteoarthritis, diverticulosis, GERD, who initially presented to office for evaluation of lower back pain. \par \sandra Initially presented to office April 2018 for consultation regarding low back pain. This pain has been present for several years. About 6 months ago she started developing left leg radicular pain above the knee in an L3 distribution. The leg pain subsided after an epidural steroid injection however. Her back pain persists and is her major complaint at this time. She also has pain radiating into her left groin. She remembers several months ago hearing her lower back snap and this correlated with back pain. MRI lumbar spine shows a left side L3-4 lateral recess and foraminal disc herniation that severely encroaches on the exiting L3 nerve root. She also has a large pelvic fibroid that probably compresses the lumbosacral plexus. \par Recommended upright lumbar flexion-extension X-rays, Lumbar CT scan. \par Referred to Gyn for uterine fibroid management as possibly contributing to her pain syndrome\par \par F/u 6/4/18:\par CT lumbar spine shows multilevel degenerative changes without fracture or evidence of instability. Planned for PT, continued f/u with GYN.\par  \par 9/9/19 f/u visit:\par Ms. Sorensen states she followed with her GYN, Dr. Wendy Daley who did not feel that a hysterectomy or removal of uterine fibroid was necessary nor contributing to Ms. Sorensen's pain. \par She saw a pain management doctor after that and had TORITO x 2 with resolution of symptoms.\par However, over the past 2-3 months she states that symptoms have returned. She reports low back pain radiating to bilateral groin (RIGHT greater than LEFT). She also reports radiating leg pain bilaterally down bilateral buttocks into posterior thighs with associated numbness/tingling. Her most severe pain is low back pain and bilateral groin pain. Pain is made worse with ambulation and improves with rest.\par She recently went to [facility] ED due to worsening symptoms. MRI lumbar spine was done at that visit on 8/28/19 and she brings it today for review. She was recommended to follow up with pain management but has not done so at this point.\par MRI lumbar spine from 8/28/19 demonstrates spinal stenosis at L4-L5, L5-S1.\par Discussed treatment options including surgery, conservative measures (pain management, PT) in detail. The patient preferred to pursue conservative measures at this point and would like to see pain management. \par Referral provided for pain management - Dr. Ehsan Aguila. Patient will make an appointment.\par \par 10/16/20 f/u: \par Returns today with worsening symptoms.\par She reports severe low back pain radiating to bilateral groin. She states the pain is constant but but exacerbated with ambulation.\par Due to COVID 19 pandemic, she has not participated in supervised physical therapy.\par Recommended MRI lumbar spine without contrast and EMG testing with Dr. Mary Joaquin. \par Also TLSO back brace while ambulating for low back pain and continued physical therapy for muscle strengthening, gait training, balance and coordination.\par \par 3/19/21 f/u visit: \par She reports increased cramping and pressure in groin as well as intermittent vaginal bleeding. She is seeing GYN surgeons at [place] and is planned for endometrial biopsy prior to large surgical resection of uterine fibroid. She was scheduled for biopsy on 4/2/21.\par She continues to report low back pain, worsened by walking. She also reports radiating leg pain (LEFT worse than RIGHT), most severe in LEFT groin.\par MRI lumbar from 12/2020 showed L5-S1 severe right and moderate left neuroforaminal narrowing. She was advised to follow up in the office after endometrial biopsy/surgical resection of uterine fibroid to evaluate symptoms. \par Was consulted to pain management for TORITO consideration--Dr. Mart Aguila.\par \par TODAY pt presents with new MRI to review. \par She continues to have lower back pain with radiculopathy despite physical therapy and OTRITO.

## 2022-06-09 ENCOUNTER — NON-APPOINTMENT (OUTPATIENT)
Age: 72
End: 2022-06-09

## 2022-06-14 ENCOUNTER — APPOINTMENT (OUTPATIENT)
Dept: PHYSICAL MEDICINE AND REHAB | Facility: CLINIC | Age: 72
End: 2022-06-14
Payer: MEDICARE

## 2022-06-14 VITALS
HEART RATE: 87 BPM | DIASTOLIC BLOOD PRESSURE: 80 MMHG | RESPIRATION RATE: 18 BRPM | BODY MASS INDEX: 39.08 KG/M2 | WEIGHT: 207 LBS | HEIGHT: 61 IN | SYSTOLIC BLOOD PRESSURE: 155 MMHG

## 2022-06-14 DIAGNOSIS — M54.16 RADICULOPATHY, LUMBAR REGION: ICD-10-CM

## 2022-06-14 DIAGNOSIS — M48.061 SPINAL STENOSIS, LUMBAR REGION WITHOUT NEUROGENIC CLAUDICATION: ICD-10-CM

## 2022-06-14 DIAGNOSIS — M48.07 SPINAL STENOSIS, LUMBOSACRAL REGION: ICD-10-CM

## 2022-06-14 DIAGNOSIS — G95.19 OTHER VASCULAR MYELOPATHIES: ICD-10-CM

## 2022-06-14 DIAGNOSIS — G89.29 RADICULOPATHY, LUMBAR REGION: ICD-10-CM

## 2022-06-14 DIAGNOSIS — M51.26 OTHER INTERVERTEBRAL DISC DISPLACEMENT, LUMBAR REGION: ICD-10-CM

## 2022-06-14 DIAGNOSIS — G89.4 CHRONIC PAIN SYNDROME: ICD-10-CM

## 2022-06-14 PROCEDURE — 99214 OFFICE O/P EST MOD 30 MIN: CPT
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
## 2022-06-14 NOTE — REVIEW OF SYSTEMS
[Fever] : no fever [Lower Ext Edema] : no lower extremity edema [Joint Pain] : joint pain [Joint Stiffness] : joint stiffness [Negative] : Heme/Lymph

## 2022-06-14 NOTE — ASSESSMENT
[FreeTextEntry1] : \par PLAN AND RECOMMENDATIONS :\par 71 year old F with chronic severe radicular LBP R> L now \par failed conservative Rx \par surgery being discussed \par needs EMG for surgical planning as multiple MRI findings -what is pain generator \par \par We discussed differential diagnosis and clinical impression\par \par Recommend\par .symptomatic care and support\par  medications as per pain \par  imaging done \par \par  hydrotherapy /heat / cold for pain\par  continue  spinal precautions including care with bending, lifting, twisting and  carrying.\par \par  relative rest and avoidance of painful activity where possible \par  increasing activity as discussed \par  return for EMG \par Information given to patient about EMG and Nerve Conduction Study Examination including  planning, differential diagnosis to rule in /rule out ,duration of the test ,precautions (if patient on blood thinner.has bleeding disorder or  pace maker device etc -still possible to undergo with care), side effects(benign-limited to short term bruising and discomfort/pain)  \par The protocol of temp checks upon arrival ,disinfection procedure of waiting room and the lab explained- reassured. \par All questions answered. \par Patient instructed to book appointment upon conclusion of appointment\par \par Information sheet ' Answers to your Questions on EMG " forwarded to patient to read prior to testing, with further information about training,background and the procedure itself .\par \par Total clinician time on the date of this encounter was at least 45 minutes- including\par \par 1 preparing to see the patient and review of prior notes, tests and other records \par 2 obtaining and reviewing and/ or confirming the history\par 3 performing a medically necessary, pertinent  and appropriate examination \par 4 ordering medications and supplements explaining side effects to look for ,tests,procedures,therapy and or specialty referrals\par 5 explaining the diagnosis or differential to the patient \par 6 communicating with other physicians or providers before during or after the visit.\par \par The patient had the opportunity to ask questions and all were answered to their satisfaction. We will coordinate treatment with the other members of the treatment team.\par The patient verbalized understanding of the management/plan rationale and agreed with my recommendations.\par

## 2022-06-14 NOTE — PHYSICAL EXAM
[FreeTextEntry1] : PHYSICAL EXAM : OBJECTIVE \par \par GENERAL : Awake ,alert and oriented to time place and person \par HEAD : normocephalic and atraumatic \par NECK : supple ,no tracheal deviation ,no thyroid enlargement noted with swallowing\par EYES : sclera and conjunctiva normal no redness,intact extraocular movements \par ENT  : ears and nose normal in appearance -hearing adequate \par PULMONARY: effort normal. No respiratory distress. breathing regular. No wheezes \par LYMPH : No swelling in limbs, capillary return within normal range \par CVS : warm extremities,no palpitations,not short of breath, no visible jugular venous distention\par PSYCH : mood and affect normal ,good eye contact ,normal attention \par ABDOMEN : no visible distension , \par NEUROLOGICAL:cranial nerves intact,muscle tone normal,gait and balance safe except where noted below \par SKIN : warm and dry No rash detected over specific body areas examined \par MUSCULOSKELETAL: normal muscle bulk, no focal bony tenderness /posture normal except where specified below\par overweight ++ poor core \par HIP ROM very painful \par knees painful to range or palpate\par SLR neg \par weak EHL leonides 4/5  but no foot drop \par plantar flex 5/5\par no cane \par No long tract signs found on clinical exam and no focal neurological deficits\par vibration intact sensation ok  \par

## 2022-06-23 ENCOUNTER — APPOINTMENT (OUTPATIENT)
Dept: PHYSICAL MEDICINE AND REHAB | Facility: CLINIC | Age: 72
End: 2022-06-23

## 2022-11-02 ENCOUNTER — APPOINTMENT (OUTPATIENT)
Age: 72
End: 2022-11-02

## 2024-01-09 NOTE — REASON FOR VISIT
English
[FreeTextEntry1] : to review MRI lumbar for hx of L5-S1 severe right and moderate left neuroforaminal narrowing